# Patient Record
Sex: FEMALE | HISPANIC OR LATINO | URBAN - METROPOLITAN AREA
[De-identification: names, ages, dates, MRNs, and addresses within clinical notes are randomized per-mention and may not be internally consistent; named-entity substitution may affect disease eponyms.]

---
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
## 2025-05-18 ENCOUNTER — OFFICE VISIT (OUTPATIENT)
Dept: URGENT CARE | Facility: PHYSICIAN GROUP | Age: 20
End: 2025-05-18
Payer: COMMERCIAL

## 2025-05-18 VITALS
TEMPERATURE: 98.6 F | WEIGHT: 184 LBS | RESPIRATION RATE: 18 BRPM | HEIGHT: 62 IN | OXYGEN SATURATION: 99 % | SYSTOLIC BLOOD PRESSURE: 110 MMHG | HEART RATE: 81 BPM | BODY MASS INDEX: 33.86 KG/M2 | DIASTOLIC BLOOD PRESSURE: 68 MMHG

## 2025-05-18 DIAGNOSIS — L03.032 PARONYCHIA OF GREAT TOE OF LEFT FOOT: Primary | ICD-10-CM

## 2025-05-18 PROCEDURE — 3074F SYST BP LT 130 MM HG: CPT | Performed by: FAMILY MEDICINE

## 2025-05-18 PROCEDURE — 3078F DIAST BP <80 MM HG: CPT | Performed by: FAMILY MEDICINE

## 2025-05-18 PROCEDURE — 99213 OFFICE O/P EST LOW 20 MIN: CPT | Performed by: FAMILY MEDICINE

## 2025-05-18 RX ORDER — CEPHALEXIN 500 MG/1
500 CAPSULE ORAL 3 TIMES DAILY
Qty: 15 CAPSULE | Refills: 0 | Status: SHIPPED | OUTPATIENT
Start: 2025-05-18 | End: 2025-05-23

## 2025-05-18 ASSESSMENT — ENCOUNTER SYMPTOMS: FEVER: 0

## 2025-05-18 NOTE — LETTER
May 18, 2025    To Whom It May Concern:         This is confirmation that Mary Grace HughesAlba attended her scheduled appointment with Fredy Gay M.D. on 5/18/25.  Please excuse her from work today due to an acute problem.  She is cleared to return to work tomorrow.         If you have any questions please do not hesitate to call me at the phone number listed below.    Sincerely,          Fredy Gay M.D.  [phone]

## 2025-05-18 NOTE — PROGRESS NOTES
"Subjective:     Mary Grace Mendoza is a 19 y.o. female who presents for Other (Ingrown toenail,left foot,x1 week)    HPI  Pt presents for evaluation of an acute problem  Patient with ingrown toenail of the left foot  Has had increased pain for the past week and having swelling and redness of the toe  Has had some purulent discharge  Pain is worsening each day    Review of Systems   Constitutional:  Negative for fever.   Skin:  Positive for rash.       PMH:  has no past medical history on file.  MEDS: Current Medications[1]  ALLERGIES: Allergies[2]  SURGHX: Past Surgical History[3]  SOCHX:  reports that she has never smoked. She has never used smokeless tobacco.     Objective:   /68   Pulse 81   Temp 37 °C (98.6 °F) (Temporal)   Resp 18   Ht 1.575 m (5' 2\")   Wt 83.5 kg (184 lb)   SpO2 99%   BMI 33.65 kg/m²     Physical Exam  Constitutional:       General: She is not in acute distress.     Appearance: She is well-developed. She is not diaphoretic.   Pulmonary:      Effort: Pulmonary effort is normal.   Skin:     Comments: Left great toe with ingrown toenail in the lateral aspect, mild swelling, mild erythema, slight purulent discharge present, no drainable abscess appreciated   Neurological:      Mental Status: She is alert.         Assessment/Plan:   Assessment    1. Paronychia of great toe of left foot  - cephALEXin (KEFLEX) 500 MG Cap; Take 1 Capsule by mouth 3 times a day for 5 days.  Dispense: 15 Capsule; Refill: 0    Patient with paronychia of the left great toe.  Has slight active discharge with no drainable abscess at this time.  Reviewed supportive care measures and expected recovery.  Reviewed how to trim the toenail in the future to prevent this from happening again.  Follow-up in the urgent care on an as-needed basis.         [1]   Current Outpatient Medications:     cephALEXin (KEFLEX) 500 MG Cap, Take 1 Capsule by mouth 3 times a day for 5 days., Disp: 15 Capsule, Rfl: 0  [2] No Known " Allergies  [3] History reviewed. No pertinent surgical history.